# Patient Record
Sex: MALE | Race: WHITE | NOT HISPANIC OR LATINO | Employment: FULL TIME | URBAN - METROPOLITAN AREA
[De-identification: names, ages, dates, MRNs, and addresses within clinical notes are randomized per-mention and may not be internally consistent; named-entity substitution may affect disease eponyms.]

---

## 2024-03-31 ENCOUNTER — OFFICE VISIT (OUTPATIENT)
Dept: URGENT CARE | Facility: CLINIC | Age: 24
End: 2024-03-31
Payer: COMMERCIAL

## 2024-03-31 VITALS
WEIGHT: 160 LBS | DIASTOLIC BLOOD PRESSURE: 60 MMHG | HEART RATE: 58 BPM | BODY MASS INDEX: 21.67 KG/M2 | RESPIRATION RATE: 20 BRPM | TEMPERATURE: 98 F | HEIGHT: 72 IN | SYSTOLIC BLOOD PRESSURE: 116 MMHG | OXYGEN SATURATION: 98 %

## 2024-03-31 DIAGNOSIS — H10.9 BACTERIAL CONJUNCTIVITIS OF RIGHT EYE: Primary | ICD-10-CM

## 2024-03-31 PROCEDURE — 99203 OFFICE O/P NEW LOW 30 MIN: CPT | Mod: S$GLB,,, | Performed by: NURSE PRACTITIONER

## 2024-03-31 RX ORDER — POLYMYXIN B SULFATE AND TRIMETHOPRIM 1; 10000 MG/ML; [USP'U]/ML
1 SOLUTION OPHTHALMIC EVERY 4 HOURS
Qty: 10 ML | Refills: 0 | Status: SHIPPED | OUTPATIENT
Start: 2024-03-31 | End: 2024-04-07

## 2024-03-31 RX ORDER — DEXTROAMPHETAMINE SULFATE, DEXTROAMPHETAMINE SACCHARATE, AMPHETAMINE SULFATE AND AMPHETAMINE ASPARTATE 5; 5; 5; 5 MG/1; MG/1; MG/1; MG/1
CAPSULE, EXTENDED RELEASE ORAL EVERY MORNING
COMMUNITY
Start: 2024-02-01

## 2024-03-31 NOTE — PROGRESS NOTES
Subjective:      Patient ID: Kieran Pineda is a 23 y.o. male.    Vitals:  height is 6' (1.829 m) and weight is 72.6 kg (160 lb). His oral temperature is 97.5 °F (36.4 °C). His blood pressure is 116/60 and his pulse is 58 (abnormal). His respiration is 20 and oxygen saturation is 98%.     Chief Complaint: Eye Problem    Pt states that he is coming in for right eye irritation. pt syms started yesterday. Pt says that his eye has been having a yellow discharge. Pt isnt in any pain. Pt self treated with Otc pain meds.       23 year old male presents to clinic with reports of right eye redness and eyelash matting with yellow discharge x 1 day    Eye Problem   The right eye is affected. This is a new problem. The current episode started yesterday. The problem occurs constantly. The problem has been unchanged. There was no injury mechanism. The pain is at a severity of 0/10. The patient is experiencing no pain. There is No known exposure to pink eye. He Does not wear contacts. Associated symptoms include blurred vision, an eye discharge and eye redness. Pertinent negatives include no double vision, itching or photophobia. The treatment provided no relief.       Eyes:  Positive for eye discharge, eye redness, blurred vision and eyelid swelling. Negative for eye trauma, foreign body in eye, eye itching, eye pain, photophobia, vision loss and double vision.      Objective:     Physical Exam   Constitutional: He is oriented to person, place, and time. He appears well-developed.   HENT:   Head: Normocephalic and atraumatic.   Ears:   Right Ear: External ear normal.   Left Ear: External ear normal.   Nose: Nose normal.   Mouth/Throat: Oropharynx is clear and moist.   Eyes: EOM and lids are normal. Pupils are equal, round, and reactive to light. Right eye exhibits discharge. Right eye exhibits no hordeolum. Right conjunctiva is injected. Extraocular movement intact vision grossly intact gaze aligned appropriately   Neck:  "Trachea normal and phonation normal. Neck supple.   Cardiovascular: Normal rate.   Pulmonary/Chest: Effort normal.   Musculoskeletal: Normal range of motion.         General: Normal range of motion.   Neurological: He is alert and oriented to person, place, and time.   Skin: Skin is warm, dry and intact.   Psychiatric: His speech is normal and behavior is normal. Judgment and thought content normal.   Nursing note and vitals reviewed.      Assessment:     1. Bacterial conjunctivitis of right eye      Vision Screening    Right eye Left eye Both eyes   Without correction 20/20 20/20 20/20   With correction          Plan:       Bacterial conjunctivitis of right eye  -     polymyxin B sulf-trimethoprim (POLYTRIM) 10,000 unit- 1 mg/mL Drop; Place 1 drop into the right eye every 4 (four) hours. for 7 days  Dispense: 10 mL; Refill: 0        Vision Screening    Right eye Left eye Both eyes   Without correction 20/20 20/20 20/20   With correction         Patient Instructions   What is bacterial conjunctivitis?  This is a bacterial infection of the eyes. It is also called "pink eye." Several different types of bacteria can cause this conjunctivitis. It can start in one eye and spread to the other within 24 to 48 hours. It is often seen with other illnesses such as an ear infection, a sinus infection, or strep throat. Symptoms include crusts that form on the eyelids overnight, yellow or green discharge from one or both eyes and burning, irritation, tearing and redness in the eyes. This type of conjunctivitis is contagious.  Treatment can include: General comfort measures, antibiotic eye drops or eye ointment. It is important to continue the medicine for as long as it is prescribed, even if your symptoms get better. You may go back to work or school 24 hours after starting antibiotics    General comfort measures you can try:  1. Place a cool, moist cloth over eyes. Use a clean cloth or paper towel that has been soaked in cool " distilled or bottled water and wrung out. Hold the cloth on your eyes up to four times a day. Use a new, clean cloth each time.    How can I prevent the spread of conjunctivitis?  1. If you wear contact lenses, stop wearing contacts during the infection. Throw away contact lenses that you are currently wearing and thoroughly clean your contact lens case. Do not start wearing contacts again until you have finished the medicine (if prescribed) and the symptoms have gone away.  2. Wash your hand often and keep them away from your eyes. Make sure to wash your hands before and after using any eye drops. When putting eye medicine in your eye, do not touch the tip of the dropper to your eye.  3. Machine wash anything that has touched the infected eye or eyes. This includes towels, wash cloths, pillow cases and tissues. These items are contaminated with the bacteria or virus and can spread the infection to others in your household. (This is not necessary for allergic conjunctivitis).    What if I still don't feel better?    Follow-up with your primary care provider if your symptoms get worse or show no improvement after 3 to 5 days of starting treatment.

## 2024-03-31 NOTE — PATIENT INSTRUCTIONS
"What is bacterial conjunctivitis?  This is a bacterial infection of the eyes. It is also called "pink eye." Several different types of bacteria can cause this conjunctivitis. It can start in one eye and spread to the other within 24 to 48 hours. It is often seen with other illnesses such as an ear infection, a sinus infection, or strep throat. Symptoms include crusts that form on the eyelids overnight, yellow or green discharge from one or both eyes and burning, irritation, tearing and redness in the eyes. This type of conjunctivitis is contagious.  Treatment can include: General comfort measures, antibiotic eye drops or eye ointment. It is important to continue the medicine for as long as it is prescribed, even if your symptoms get better. You may go back to work or school 24 hours after starting antibiotics    General comfort measures you can try:  1. Place a cool, moist cloth over eyes. Use a clean cloth or paper towel that has been soaked in cool distilled or bottled water and wrung out. Hold the cloth on your eyes up to four times a day. Use a new, clean cloth each time.    How can I prevent the spread of conjunctivitis?  1. If you wear contact lenses, stop wearing contacts during the infection. Throw away contact lenses that you are currently wearing and thoroughly clean your contact lens case. Do not start wearing contacts again until you have finished the medicine (if prescribed) and the symptoms have gone away.  2. Wash your hand often and keep them away from your eyes. Make sure to wash your hands before and after using any eye drops. When putting eye medicine in your eye, do not touch the tip of the dropper to your eye.  3. Machine wash anything that has touched the infected eye or eyes. This includes towels, wash cloths, pillow cases and tissues. These items are contaminated with the bacteria or virus and can spread the infection to others in your household. (This is not necessary for allergic " conjunctivitis).    What if I still don't feel better?    Follow-up with your primary care provider if your symptoms get worse or show no improvement after 3 to 5 days of starting treatment.